# Patient Record
Sex: MALE | Race: BLACK OR AFRICAN AMERICAN | NOT HISPANIC OR LATINO | Employment: FULL TIME | URBAN - METROPOLITAN AREA
[De-identification: names, ages, dates, MRNs, and addresses within clinical notes are randomized per-mention and may not be internally consistent; named-entity substitution may affect disease eponyms.]

---

## 2023-06-28 ENCOUNTER — TRANSFERRED RECORDS (OUTPATIENT)
Dept: HEALTH INFORMATION MANAGEMENT | Facility: CLINIC | Age: 43
End: 2023-06-28

## 2023-06-29 ENCOUNTER — TRANSCRIBE ORDERS (OUTPATIENT)
Dept: OTHER | Age: 43
End: 2023-06-29

## 2023-06-29 DIAGNOSIS — H46.9 OPTIC NEUROPATHY: Primary | ICD-10-CM

## 2023-07-15 ENCOUNTER — HEALTH MAINTENANCE LETTER (OUTPATIENT)
Age: 43
End: 2023-07-15

## 2023-07-26 ENCOUNTER — TRANSFERRED RECORDS (OUTPATIENT)
Dept: HEALTH INFORMATION MANAGEMENT | Facility: CLINIC | Age: 43
End: 2023-07-26

## 2023-07-26 ENCOUNTER — LAB REQUISITION (OUTPATIENT)
Dept: LAB | Facility: CLINIC | Age: 43
End: 2023-07-26
Payer: COMMERCIAL

## 2023-07-26 DIAGNOSIS — Z13.6 ENCOUNTER FOR SCREENING FOR CARDIOVASCULAR DISORDERS: ICD-10-CM

## 2023-07-26 DIAGNOSIS — R35.0 FREQUENCY OF MICTURITION: ICD-10-CM

## 2023-07-26 DIAGNOSIS — N52.9 MALE ERECTILE DYSFUNCTION, UNSPECIFIED: ICD-10-CM

## 2023-07-26 DIAGNOSIS — R68.82 DECREASED LIBIDO: ICD-10-CM

## 2023-07-26 LAB
ANION GAP SERPL CALCULATED.3IONS-SCNC: 13 MMOL/L (ref 7–15)
BUN SERPL-MCNC: 8.6 MG/DL (ref 6–20)
CALCIUM SERPL-MCNC: 9.8 MG/DL (ref 8.6–10)
CHLORIDE SERPL-SCNC: 103 MMOL/L (ref 98–107)
CHOLEST SERPL-MCNC: 178 MG/DL
CREAT SERPL-MCNC: 0.91 MG/DL (ref 0.67–1.17)
DEPRECATED HCO3 PLAS-SCNC: 24 MMOL/L (ref 22–29)
GFR SERPL CREATININE-BSD FRML MDRD: >90 ML/MIN/1.73M2
GLUCOSE SERPL-MCNC: 109 MG/DL (ref 70–99)
HDLC SERPL-MCNC: 38 MG/DL
LDLC SERPL CALC-MCNC: 99 MG/DL
NONHDLC SERPL-MCNC: 140 MG/DL
POTASSIUM SERPL-SCNC: 4 MMOL/L (ref 3.4–5.3)
PSA SERPL DL<=0.01 NG/ML-MCNC: 1.06 NG/ML (ref 0–2.5)
SODIUM SERPL-SCNC: 140 MMOL/L (ref 136–145)
TRIGL SERPL-MCNC: 206 MG/DL

## 2023-07-26 PROCEDURE — 87086 URINE CULTURE/COLONY COUNT: CPT | Mod: ORL | Performed by: STUDENT IN AN ORGANIZED HEALTH CARE EDUCATION/TRAINING PROGRAM

## 2023-07-26 PROCEDURE — 80061 LIPID PANEL: CPT | Mod: ORL | Performed by: STUDENT IN AN ORGANIZED HEALTH CARE EDUCATION/TRAINING PROGRAM

## 2023-07-26 PROCEDURE — 80048 BASIC METABOLIC PNL TOTAL CA: CPT | Mod: ORL | Performed by: STUDENT IN AN ORGANIZED HEALTH CARE EDUCATION/TRAINING PROGRAM

## 2023-07-26 PROCEDURE — 84403 ASSAY OF TOTAL TESTOSTERONE: CPT | Mod: ORL | Performed by: STUDENT IN AN ORGANIZED HEALTH CARE EDUCATION/TRAINING PROGRAM

## 2023-07-26 PROCEDURE — G0103 PSA SCREENING: HCPCS | Mod: ORL | Performed by: STUDENT IN AN ORGANIZED HEALTH CARE EDUCATION/TRAINING PROGRAM

## 2023-07-28 LAB
BACTERIA UR CULT: NO GROWTH
TESTOST SERPL-MCNC: 258 NG/DL (ref 240–950)

## 2023-08-20 NOTE — PROGRESS NOTES
Manuel Figueroa is a 42 year old male with the following diagnoses:   1. Subjective visual disturbance    2. Optic neuropathy    3. Visual field defect         Patient was sent for consultation by Dr. Morrison for optic neuropathy.    HPI:    Patient reports left eye pain for the past 3-4 years. He went to [facility] for an annual eye exam and they were concerned for glaucoma and they referred him to Dr. Morrison. Dr. Morrison thought there might be glaucoma and he was started on Latanoprost in both eyes. He thinks his eye pressure was around 15-16 in both eyes that time and lowered to around 12 at follow up. He had not had a dilated exam prior to this appointment at [facility].    His left eye pain is intermittent and comes/goes. It is more discomfort than pain. The left eye gets watery and he has to rub it. The pain feels like a strain or pulling sensation. He uses his glaucoma drops but no artificial tears. The pain is occurring about once a week. He does not get headaches. No headaches or bulging of the eyes. No double vision. Otherwise feeling well. He has intentionally lost some weight the past few weeks from going to the gym. He has been drinking a lot of protein shakes lately. He has had a difficult time driving lately especially at night. He has not noticed decreased peripheral vision.    In the past he was a daily drinker for several years.      Independent historians:  Patient    Review of outside testing:    No imaging or relevant lab work      Review of outside clinical notes:    6/29/23 -- Visit with Dr. Morrison        Past medical history:    Patient Active Problem List   Diagnosis    Tobacco dependence       Medications:   Latanoprost at bedtime both eyes  Multivitamin  Melatonin  Omega 3      Family history / social history:  Patient was adopted - no known family history of glaucoma.    Patient is a current smoker 0.5 pack/day. No alcohol or other drug use. He exercises regularly. He is currently  employed.      Exam:  Visual acuity 20/20 right eye 20/40 left eye with no improvement with pinhole.  Color vision 11/11 right eye and 6/11 left eye.  Pupils with left APD.  Intraocular pressure 9 right eye and 9 left eye.  Anterior segment exam with mild cataract in both eyes. Fundus exam with enlarged cup to disc ratio in the right eye and optic nerve pallor and severe cupping in the left eye.     Left eye pain resolved with proparacaine.    Tests ordered and interpreted today:  OCT RNFL 8/22/23:  Right eye: G56, diffuse thinning sparing nasally  Left eye: G34, diffuse thinning in all quadrants    GTOP visual fields 8/22/23:  Right eye: reliable, mean deviation 4.3, inferonasal arcuate defect  Left eye: reliable, mean deviation 14.1, superior and inferior arcuate defects involving central visual axis    Discussion of management / interpretation with another provider:   None    Assessment/Plan:   It is my impression that patient has bilateral optic neuropathy left > right. 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  He is adopted so family history is not available.  There are features that are a bit atypical for glaucoma including his young age and his color vision loss in the LEFT eye. To that end, will obtain MRI brain and lab workup to evaluate for compressive optic neuropathy and causes of bilateral optic atrophy.     If his testing is normal, then recommend continue following with Dr. Morrison for glaucoma management. If the testing is not normal, then will address with the patient as they arise.     His left eye pain resolved with proparacaine which makes dry eye most likely diagnosis. Recommend starting artificial tears.          Attending Physician Attestation:  Complete documentation of historical and exam elements from today's encounter can be found in the full encounter summary  report (not reduplicated in this progress note).  I personally obtained the chief complaint(s) and history of present illness.  I confirmed and edited as necessary the review of systems, past medical/surgical history, family history, social history, and examination findings as documented by others; and I examined the patient myself.  I personally reviewed the relevant tests, images, and reports as documented above.  I formulated and edited as necessary the assessment and plan and discussed the findings and management plan with the patient and family. I personally reviewed the ophthalmic test(s) associated with this encounter, agree with the interpretation(s) as documented by the resident/fellow, and have edited the corresponding report(s) as necessary.  - Kenton Stewart MD  Ophthalmology Resident PGY3

## 2023-08-22 ENCOUNTER — LAB (OUTPATIENT)
Dept: LAB | Facility: CLINIC | Age: 43
End: 2023-08-22
Payer: COMMERCIAL

## 2023-08-22 ENCOUNTER — OFFICE VISIT (OUTPATIENT)
Dept: OPHTHALMOLOGY | Facility: CLINIC | Age: 43
End: 2023-08-22
Attending: OPHTHALMOLOGY
Payer: COMMERCIAL

## 2023-08-22 DIAGNOSIS — H46.9 OPTIC NEUROPATHY: Primary | ICD-10-CM

## 2023-08-22 DIAGNOSIS — H53.40 VISUAL FIELD DEFECT: ICD-10-CM

## 2023-08-22 DIAGNOSIS — H53.40 VISUAL FIELD DEFECT: Primary | ICD-10-CM

## 2023-08-22 DIAGNOSIS — H04.123 DRY EYES: ICD-10-CM

## 2023-08-22 DIAGNOSIS — H46.9 OPTIC NEUROPATHY: ICD-10-CM

## 2023-08-22 PROBLEM — F17.200 TOBACCO DEPENDENCE: Status: ACTIVE | Noted: 2018-08-02

## 2023-08-22 LAB
HCT VFR BLD AUTO: 46 % (ref 40–53)
T PALLIDUM AB SER QL: NONREACTIVE
VIT B12 SERPL-MCNC: 592 PG/ML (ref 232–1245)

## 2023-08-22 PROCEDURE — 84425 ASSAY OF VITAMIN B-1: CPT | Mod: 90 | Performed by: PATHOLOGY

## 2023-08-22 PROCEDURE — 82175 ASSAY OF ARSENIC: CPT | Mod: 90 | Performed by: PATHOLOGY

## 2023-08-22 PROCEDURE — 83825 ASSAY OF MERCURY: CPT | Mod: 90 | Performed by: PATHOLOGY

## 2023-08-22 PROCEDURE — G0463 HOSPITAL OUTPT CLINIC VISIT: HCPCS | Performed by: OPHTHALMOLOGY

## 2023-08-22 PROCEDURE — 82747 ASSAY OF FOLIC ACID RBC: CPT | Mod: 90 | Performed by: PATHOLOGY

## 2023-08-22 PROCEDURE — 92133 CPTRZD OPH DX IMG PST SGM ON: CPT | Performed by: OPHTHALMOLOGY

## 2023-08-22 PROCEDURE — 83655 ASSAY OF LEAD: CPT | Mod: 90 | Performed by: PATHOLOGY

## 2023-08-22 PROCEDURE — 83921 ORGANIC ACID SINGLE QUANT: CPT | Performed by: OPHTHALMOLOGY

## 2023-08-22 PROCEDURE — 86780 TREPONEMA PALLIDUM: CPT | Performed by: OPHTHALMOLOGY

## 2023-08-22 PROCEDURE — 92083 EXTENDED VISUAL FIELD XM: CPT | Performed by: OPHTHALMOLOGY

## 2023-08-22 PROCEDURE — 99000 SPECIMEN HANDLING OFFICE-LAB: CPT | Performed by: PATHOLOGY

## 2023-08-22 PROCEDURE — 99204 OFFICE O/P NEW MOD 45 MIN: CPT | Mod: GC | Performed by: OPHTHALMOLOGY

## 2023-08-22 PROCEDURE — 82607 VITAMIN B-12: CPT | Performed by: OPHTHALMOLOGY

## 2023-08-22 PROCEDURE — 83090 ASSAY OF HOMOCYSTEINE: CPT | Performed by: OPHTHALMOLOGY

## 2023-08-22 PROCEDURE — 85014 HEMATOCRIT: CPT | Performed by: PATHOLOGY

## 2023-08-22 PROCEDURE — 36415 COLL VENOUS BLD VENIPUNCTURE: CPT | Performed by: PATHOLOGY

## 2023-08-22 RX ORDER — LATANOPROST 50 UG/ML
SOLUTION/ DROPS OPHTHALMIC
COMMUNITY
Start: 2023-06-28

## 2023-08-22 ASSESSMENT — CONF VISUAL FIELD
OS_INFERIOR_NASAL_RESTRICTION: 0
OD_INFERIOR_NASAL_RESTRICTION: 0
OD_SUPERIOR_TEMPORAL_RESTRICTION: 0
OS_INFERIOR_TEMPORAL_RESTRICTION: 0
OD_SUPERIOR_NASAL_RESTRICTION: 0
OS_NORMAL: 1
OD_INFERIOR_TEMPORAL_RESTRICTION: 0
OS_SUPERIOR_NASAL_RESTRICTION: 0
OD_NORMAL: 1
METHOD: COUNTING FINGERS
OS_SUPERIOR_TEMPORAL_RESTRICTION: 0

## 2023-08-22 ASSESSMENT — VISUAL ACUITY
OS_SC: 20/40
METHOD: SNELLEN - LINEAR
OD_SC+: -1
OS_SC+: -2
OD_SC: 20/20

## 2023-08-22 ASSESSMENT — CUP TO DISC RATIO
OS_RATIO: 0.99
OD_RATIO: 0.7

## 2023-08-22 ASSESSMENT — TONOMETRY
OD_IOP_MMHG: 09
OS_IOP_MMHG: 09
IOP_METHOD: ICARE

## 2023-08-22 ASSESSMENT — EXTERNAL EXAM - RIGHT EYE: OD_EXAM: NORMAL

## 2023-08-22 ASSESSMENT — SLIT LAMP EXAM - LIDS
COMMENTS: NORMAL
COMMENTS: NORMAL

## 2023-08-22 ASSESSMENT — EXTERNAL EXAM - LEFT EYE: OS_EXAM: NORMAL

## 2023-08-22 NOTE — LETTER
2023         RE:  :  MRN: Manuel Figueroa  1980  [id number]     Dear Dr. Morrison,    Thank you for asking me to see your very pleasant patient, Manuel Figueroa, in neuro-ophthalmic consultation.  I would like to thank you for sending your records and I have summarized them in the history of present illness.  My assessment and plan are below.  For further details, please see my attached clinic note.      Manuel Figueroa is a 42 year old male with the following diagnoses:   1. Subjective visual disturbance    2. Optic neuropathy    3. Visual field defect       Patient was sent for consultation by Dr. Morrison for optic neuropathy.    HPI:    Patient reports left eye pain for the past 3-4 years. He went to [facility] for an annual eye exam and they were concerned for glaucoma and they referred him to Dr. Morrison. Dr. Morrison thought there might be glaucoma and he was started on Latanoprost in both eyes. He thinks his eye pressure was around 15-16 in both eyes that time and lowered to around 12 at follow up. He had not had a dilated exam prior to this appointment at [facility].    His left eye pain is intermittent and comes/goes. It is more discomfort than pain. The left eye gets watery and he has to rub it. The pain feels like a strain or pulling sensation. He uses his glaucoma drops but no artificial tears. The pain is occurring about once a week. He does not get headaches. No headaches or bulging of the eyes. No double vision. Otherwise feeling well. He has intentionally lost some weight the past few weeks from going to the gym. He has been drinking a lot of protein shakes lately. He has had a difficult time driving lately especially at night. He has not noticed decreased peripheral vision.    In the past he was a daily drinker for several years.    Independent historians: Patient    Review of outside testing: No imaging or relevant lab work    Review of outside clinical notes:    23 -- Visit  with Dr. Morrison        Past medical history:    Patient Active Problem List   Diagnosis    Tobacco dependence       Medications:   Latanoprost at bedtime both eyes  Multivitamin  Melatonin  Omega 3    Family history / social history: Patient was adopted - no known family history of glaucoma.    Patient is a current smoker 0.5 pack/day. No alcohol or other drug use. He exercises regularly. He is currently employed.    Exam:  Visual acuity 20/20 right eye 20/40 left eye with no improvement with pinhole.  Color vision 11/11 right eye and 6/11 left eye.  Pupils with left APD.  Intraocular pressure 9 right eye and 9 left eye.  Anterior segment exam with mild cataract in both eyes. Fundus exam with enlarged cup to disc ratio in the right eye and optic nerve pallor and severe cupping in the left eye.     Left eye pain resolved with proparacaine.    Tests ordered and interpreted today:  OCT RNFL 8/22/23:  Right eye: G56, diffuse thinning sparing nasally  Left eye: G34, diffuse thinning in all quadrants    GTOP visual fields 8/22/23:  Right eye: reliable, mean deviation 4.3, inferonasal arcuate defect  Left eye: reliable, mean deviation 14.1, superior and inferior arcuate defects involving central visual axis    Discussion of management / interpretation with another provider: None    Assessment/Plan: It is my impression that patient has bilateral optic neuropathy left > right. 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  He is adopted so family history is not available.  There are features that are a bit atypical for glaucoma including his young age and his color vision loss in the LEFT eye. To that end, will obtain MRI brain and lab workup to evaluate for compressive optic neuropathy and causes of bilateral optic atrophy.     If his testing is normal, then recommend continue following with   [facility] for glaucoma management. If the testing is not normal, then will address with the patient as they arise.     His left eye pain resolved with proparacaine which makes dry eye most likely diagnosis. Recommend starting artificial tears.    Again, thank you for allowing me to participate in the care of your patient.      Sincerely,    Kenton Flores MD  Professor  Ophthalmology Residency   Director of Neuro-Ophthalmology  [facility] [facility] [employer] Endowed Chair  Departments of Ophthalmology, Neurology, and Neurosurgery  [address]  [address]  T - [phone]  F - [phone]  SAMANTHA [email or web address]      CC: Martha Morrison MD  [facility]  [address] [facility] [address]  Via Fax: [phone]    DX = glaucoma vs. Optic neuropathy

## 2023-08-22 NOTE — NURSING NOTE
Chief Complaint(s) and History of Present Illness(es)       New Patient    In left eye.  This started 1 year ago.  Since onset it is stable.  Associated symptoms include eye pain.  Negative for double vision.  Pain was noted as 0/10. Additional comments: Manuel Figueroa is a 42 year old male sent for consultation by Dr. Morrison for optic neuropathy.    Manuel reports of left eye pain on and off for the last year.  Difficult to drive at nighttime. No double vision.  No headaches or migraines.  Saw optometrist at [facility] for routine eye exam, waiting to  glasses.  FAYE Wyatt 8/22/2023 8:30 AM

## 2023-08-23 LAB
ARSENIC BLD-MCNC: <10 UG/L
HCYS SERPL-SCNC: 8.5 UMOL/L (ref 4–12)
LEAD BLDV-MCNC: <2 UG/DL
MERCURY BLD-MCNC: <2.5 UG/L
METHYLMALONATE SERPL-SCNC: 0.19 UMOL/L (ref 0–0.4)

## 2023-08-24 LAB — FOLATE RBC-MCNC: 470 NG/ML

## 2023-08-25 LAB — VIT B1 PYROPHOSHATE BLD-SCNC: 167 NMOL/L

## 2023-09-09 ENCOUNTER — HOSPITAL ENCOUNTER (OUTPATIENT)
Dept: MRI IMAGING | Facility: CLINIC | Age: 43
Discharge: HOME | End: 2023-09-09
Attending: OPHTHALMOLOGY | Admitting: OPHTHALMOLOGY
Payer: COMMERCIAL

## 2023-09-09 ENCOUNTER — ANCILLARY ORDERS (OUTPATIENT)
Dept: OPHTHALMOLOGY | Facility: CLINIC | Age: 43
End: 2023-09-09

## 2023-09-09 DIAGNOSIS — H04.123 DRY EYES: ICD-10-CM

## 2023-09-09 DIAGNOSIS — H53.40 VISUAL FIELD DEFECT: ICD-10-CM

## 2023-09-09 DIAGNOSIS — H46.9 OPTIC NEUROPATHY: Primary | ICD-10-CM

## 2023-09-09 DIAGNOSIS — H46.9 OPTIC NEUROPATHY: ICD-10-CM

## 2023-09-09 PROCEDURE — 70540 MRI ORBIT/FACE/NECK W/O DYE: CPT

## 2024-01-24 ENCOUNTER — TRANSFERRED RECORDS (OUTPATIENT)
Dept: HEALTH INFORMATION MANAGEMENT | Facility: CLINIC | Age: 44
End: 2024-01-24
Payer: COMMERCIAL

## 2024-07-24 ENCOUNTER — TRANSFERRED RECORDS (OUTPATIENT)
Dept: HEALTH INFORMATION MANAGEMENT | Facility: CLINIC | Age: 44
End: 2024-07-24
Payer: COMMERCIAL

## 2024-09-03 ENCOUNTER — TRANSFERRED RECORDS (OUTPATIENT)
Dept: HEALTH INFORMATION MANAGEMENT | Facility: CLINIC | Age: 44
End: 2024-09-03
Payer: COMMERCIAL

## 2024-09-07 ENCOUNTER — HEALTH MAINTENANCE LETTER (OUTPATIENT)
Age: 44
End: 2024-09-07